# Patient Record
Sex: FEMALE | Race: WHITE
[De-identification: names, ages, dates, MRNs, and addresses within clinical notes are randomized per-mention and may not be internally consistent; named-entity substitution may affect disease eponyms.]

---
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
## 2020-01-03 NOTE — ED PHYSICIAN DOCUMENTATION
History of Present Illness





- Stated complaint


Stated Complaint: THROAT SWELLING/PRESSURE IN EARS





- Chief complaint


Chief Complaint: Heent





- Additonal information


Additional information: 





This is a 19F who presents with several days of sorethroat, as well as mild to 

moderate headache which was gradual in onset. She has felt congested and her 

hearing feels somewhat muffled as well. No shortness of breath, weakness, 

numbness. She has had cough and nasal congestion as well.





Review of Systems


Throat: reports: Sore throat


Cardiac: denies: Chest pain / pressure


Respiratory: denies: Dyspnea





PD PAST MEDICAL HISTORY





- Past Medical History


Past Medical History: No





- Past Surgical History


Past Surgical History: No





- Present Medications


Home Medications: 


                                Ambulatory Orders











 Medication  Instructions  Recorded  Confirmed


 


Fluticasone [Flonase] 1 sprays SARTHAK BID PRN #1 bottle 12/27/19 














- Allergies


Allergies/Adverse Reactions: 


                                    Allergies











Allergy/AdvReac Type Severity Reaction Status Date / Time


 


No Known Drug Allergies Allergy   Verified 12/27/19 16:19














- Social History


Does the pt smoke?: No


Smoking Status: Never smoker


Does the pt drink ETOH?: No


Substance Use and Type: Marijuana





PD ED PE NORMAL





- Vitals


Vital signs reviewed: Yes





- General


General: Alert and oriented X 3





- HEENT


HEENT: Atraumatic, PERRL, Other (BL serous effusions, no purulence or bulging of

 the TMS. Post pharnx erythematous without exudate.)





- Neck


Neck: Supple, no meningeal sign





- Cardiac


Cardiac: RRR





- Respiratory


Respiratory: No respiratory distress, Clear bilaterally





- Abdomen


Abdomen: Non distended





- Neuro


Neuro: Alert and oriented X 3, CNs 2-12 intact, No motor deficit, No sensory 

deficit, Normal speech





Results





- Vitals


Vitals: 





                                     Oxygen











O2 Source                      Room air

















- Labs


Labs: 





                                  Microbiology











 12/27/19 16:20 Group A Strep Throat Culture - Final





 Throat    MIXED OROPHARYNGEAL KRISTIAN PRESENT. NO BETA STREP PRESENT IN





    CULTURE.








                                Laboratory Tests











  12/27/19





  16:20


 


Group A Strep Rapid  Negative














PD MEDICAL DECISION MAKING





- ED course


ED course: 


Pt appears to have a URI with pharyngitis and serous effusions. No signs of 

bacterial otitis media, pneumonia, PTA, or sinusitis at this time. Headache is 

benign in history and without red flags. She is well appearing. Rapid strep is 

negative and her symptoms are more consistent with viral URI. She was given 

dexamethasone and supportive care and symptom-controling medications reviewed. 

PCP follow up and return precautions discussed and she was discharged home in 

good condition








Departure





- Departure


Disposition: 01 Home, Self Care


Clinical Impression: 


 Viral upper respiratory illness





Condition: Good


Instructions:  Sore Throat


Prescriptions: 


Fluticasone [Flonase] 1 sprays SARTHAK BID PRN #1 bottle


 PRN Reason: Nasal Congestion


Comments: 


You were seen today for Sore throat and pressure in your ears.  It appears that 

you have some eustachian tube dysfunction leading to fluid behind your ears.  

You can try decongestant such as Benadryl 25-50 mg up to 3 times daily, or 

Sudafed.  You may also take Tylenol 650 mg every 6 hours and ibuprofen 600 mg 

every 6 hours for fever or discomfort.  I am also prescribing you fluticasone, 

which is a nasal steroid spray which may help.  Your strep swab was negative 

today, but it was sent to culture and if the culture is positive you will be 

called and started on antibiotics.  If you develop any worsening symptoms like 

we talked about, return to the emergency department.


Discharge Date/Time: 12/27/19 18:48

## 2022-01-10 ENCOUNTER — HOSPITAL ENCOUNTER (EMERGENCY)
Dept: HOSPITAL 76 - ED | Age: 22
Discharge: HOME | End: 2022-01-10
Payer: MEDICAID

## 2022-01-10 VITALS — DIASTOLIC BLOOD PRESSURE: 62 MMHG | SYSTOLIC BLOOD PRESSURE: 128 MMHG

## 2022-01-10 DIAGNOSIS — N93.8: Primary | ICD-10-CM

## 2022-01-10 DIAGNOSIS — N92.0: ICD-10-CM

## 2022-01-10 LAB
ALBUMIN DIAFP-MCNC: 4 G/DL (ref 3.2–5.5)
ALBUMIN/GLOB SERPL: 1 {RATIO} (ref 1–2.2)
ALP SERPL-CCNC: 71 IU/L (ref 42–121)
ALT SERPL W P-5'-P-CCNC: 23 IU/L (ref 10–60)
ANION GAP SERPL CALCULATED.4IONS-SCNC: 8 MMOL/L (ref 6–13)
AST SERPL W P-5'-P-CCNC: 18 IU/L (ref 10–42)
BASOPHILS NFR BLD AUTO: 0 10^3/UL (ref 0–0.1)
BASOPHILS NFR BLD AUTO: 0.3 %
BILIRUB BLD-MCNC: 0.4 MG/DL (ref 0.2–1)
BUN SERPL-MCNC: 8 MG/DL (ref 6–20)
CALCIUM UR-MCNC: 8.9 MG/DL (ref 8.5–10.3)
CHLORIDE SERPL-SCNC: 105 MMOL/L (ref 101–111)
CLARITY UR REFRACT.AUTO: (no result)
CO2 SERPL-SCNC: 26 MMOL/L (ref 21–32)
CREAT SERPLBLD-SCNC: 0.6 MG/DL (ref 0.4–1)
EOSINOPHIL # BLD AUTO: 0.1 10^3/UL (ref 0–0.7)
EOSINOPHIL NFR BLD AUTO: 0.8 %
ERYTHROCYTE [DISTWIDTH] IN BLOOD BY AUTOMATED COUNT: 13.5 % (ref 12–15)
GFRSERPLBLD MDRD-ARVRAT: 126 ML/MIN/{1.73_M2} (ref 89–?)
GLOBULIN SER-MCNC: 3.9 G/DL (ref 2.1–4.2)
GLUCOSE SERPL-MCNC: 116 MG/DL (ref 70–100)
GLUCOSE UR QL STRIP.AUTO: NEGATIVE MG/DL
HCG UR QL: NEGATIVE
HCT VFR BLD AUTO: 40.9 % (ref 37–47)
HGB UR QL STRIP: 13.1 G/DL (ref 12–16)
KETONES UR QL STRIP.AUTO: NEGATIVE MG/DL
LIPASE SERPL-CCNC: 37 U/L (ref 22–51)
LYMPHOCYTES # SPEC AUTO: 4 10^3/UL (ref 1.5–3.5)
LYMPHOCYTES NFR BLD AUTO: 26.3 %
MCH RBC QN AUTO: 29.4 PG (ref 27–31)
MCHC RBC AUTO-ENTMCNC: 32 G/DL (ref 32–36)
MCV RBC AUTO: 91.7 FL (ref 81–99)
MONOCYTES # BLD AUTO: 0.6 10^3/UL (ref 0–1)
MONOCYTES NFR BLD AUTO: 4.1 %
NEUTROPHILS # BLD AUTO: 10.3 10^3/UL (ref 1.5–6.6)
NEUTROPHILS # SNV AUTO: 15.1 X10^3/UL (ref 4.8–10.8)
NEUTROPHILS NFR BLD AUTO: 68.1 %
NITRITE UR QL STRIP.AUTO: NEGATIVE
NRBC # BLD AUTO: 0 /100WBC
NRBC # BLD AUTO: 0 X10^3/UL
PDW BLD AUTO: 9.4 FL (ref 7.9–10.8)
PH UR STRIP.AUTO: 6 PH (ref 5–7.5)
PLATELET # BLD: 317 10^3/UL (ref 130–450)
POTASSIUM SERPL-SCNC: 4.3 MMOL/L (ref 3.5–5)
PROT SPEC-MCNC: 7.9 G/DL (ref 6.7–8.2)
PROT UR STRIP.AUTO-MCNC: NEGATIVE MG/DL
RBC # UR STRIP.AUTO: (no result) /UL
RBC # URNS HPF: (no result) /HPF (ref 0–5)
RBC MAR: 4.46 10^6/UL (ref 4.2–5.4)
SODIUM SERPLBLD-SCNC: 139 MMOL/L (ref 135–145)
SP GR UR STRIP.AUTO: 1.01 (ref 1–1.03)
SQUAMOUS URNS QL MICRO: (no result)
UROBILINOGEN UR QL STRIP.AUTO: (no result) E.U./DL
UROBILINOGEN UR STRIP.AUTO-MCNC: NEGATIVE MG/DL
WBC # UR MANUAL: (no result) /HPF (ref 0–5)

## 2022-01-10 PROCEDURE — 87086 URINE CULTURE/COLONY COUNT: CPT

## 2022-01-10 PROCEDURE — 80053 COMPREHEN METABOLIC PANEL: CPT

## 2022-01-10 PROCEDURE — 81001 URINALYSIS AUTO W/SCOPE: CPT

## 2022-01-10 PROCEDURE — 81003 URINALYSIS AUTO W/O SCOPE: CPT

## 2022-01-10 PROCEDURE — 85025 COMPLETE CBC W/AUTO DIFF WBC: CPT

## 2022-01-10 PROCEDURE — 36415 COLL VENOUS BLD VENIPUNCTURE: CPT

## 2022-01-10 PROCEDURE — 83690 ASSAY OF LIPASE: CPT

## 2022-01-10 PROCEDURE — 81025 URINE PREGNANCY TEST: CPT

## 2022-01-10 PROCEDURE — 99283 EMERGENCY DEPT VISIT LOW MDM: CPT

## 2022-01-10 NOTE — ED PHYSICIAN DOCUMENTATION
History of Present Illness





- Stated complaint


Stated Complaint: MENSTRUAL ISSUES





- Chief complaint


Chief Complaint: Abd Pain





- History obtained from


History obtained from: Patient





- Additonal information


Additional information: 





21-year-old woman with history of PCOS had Nexplanon placed few months ago.  

Last week and a half has had heavy vaginal bleeding associated with menstrual 

cramps that do not seem to lateralize.





Review of Systems


Constitutional: denies: Fever, Chills


Cardiac: reports: Reviewed and negative


Respiratory: reports: Reviewed and negative





PD PAST MEDICAL HISTORY





- Past Surgical History


Past Surgical History: No





- Present Medications


Home Medications: 


                                Ambulatory Orders











 Medication  Instructions  Recorded  Confirmed


 


Etonogestrel [Nexplanon] 68 mg SQ ONCE 01/10/22 01/10/22


 


HYDROcod/ACETAM 5/325 [Norco 5/325] 1 - 2 tablet PO Q6H PRN #14 tablet 01/10/22 


 


Norgestimate-Ethinyl Estradiol 1 each PO TID #1 packet 01/10/22 





[Ortho Tri-Cyclen 28 Tablet]   














- Allergies


Allergies/Adverse Reactions: 


                                    Allergies











Allergy/AdvReac Type Severity Reaction Status Date / Time


 


No Known Drug Allergies Allergy   Verified 01/10/22 15:28














- Social History


Does the pt smoke?: No


Smoking Status: Never smoker


Does the pt drink ETOH?: No





PD ED PE NORMAL





- Vitals


Vital signs reviewed: Yes





- General


General: Alert and oriented X 3, No acute distress





- Abdomen


Abdomen: Normal bowel sounds, Soft, Non tender





- Neuro


Neuro: Alert and oriented X 3, Normal speech





- Psych


Psych: Normal mood, Normal affect





Results





- Vitals


Vitals: 





                               Vital Signs - 24 hr











  01/10/22





  15:29


 


Temperature 36 C L


 


Heart Rate 97


 


Respiratory 18





Rate 


 


Blood Pressure 138/64 H


 


O2 Saturation 99








                                     Oxygen











O2 Source                      Room air

















- Labs


Labs: 





                                Laboratory Tests











  01/10/22 01/10/22 01/10/22





  16:07 16:07 17:07


 


WBC  15.1 H  


 


RBC  4.46  


 


Hgb  13.1  


 


Hct  40.9  


 


MCV  91.7  


 


MCH  29.4  


 


MCHC  32.0  


 


RDW  13.5  


 


Plt Count  317  


 


MPV  9.4  


 


Neut # (Auto)  10.3 H  


 


Lymph # (Auto)  4.0 H  


 


Mono # (Auto)  0.6  


 


Eos # (Auto)  0.1  


 


Baso # (Auto)  0.0  


 


Absolute Nucleated RBC  0.00  


 


Nucleated RBC %  0.0  


 


Sodium   139 


 


Potassium   4.3 


 


Chloride   105 


 


Carbon Dioxide   26 


 


Anion Gap   8.0 


 


BUN   8 


 


Creatinine   0.6 


 


Estimated GFR (MDRD)   126 


 


Glucose   116 H 


 


Calcium   8.9 


 


Total Bilirubin   0.4 


 


AST   18 


 


ALT   23 


 


Alkaline Phosphatase   71 


 


Total Protein   7.9 


 


Albumin   4.0 


 


Globulin   3.9 


 


Albumin/Globulin Ratio   1.0 


 


Lipase   37 


 


Urine Color    LT RED


 


Urine Clarity    SL. CLOUDY


 


Urine pH    6.0


 


Ur Specific Gravity    1.015


 


Urine Protein    NEGATIVE


 


Urine Glucose (UA)    NEGATIVE


 


Urine Ketones    NEGATIVE


 


Urine Occult Blood    LARGE H


 


Urine Nitrite    NEGATIVE


 


Urine Bilirubin    NEGATIVE


 


Urine Urobilinogen    0.2 (NORMAL)


 


Ur Leukocyte Esterase    TRACE H


 


Urine RBC    TNTC H


 


Urine WBC    0-3


 


Ur Squamous Epith Cells    NONE SEEN


 


Urine Bacteria    None Seen


 


Ur Microscopic Review    INDICATED


 


Urine Culture Comments    INDICATED


 


Urine HCG, Qual    NEGATIVE














PD MEDICAL DECISION MAKING





- ED course


ED course: 





21-year-old woman with heavy vaginal bleeding not associated with pregnancy, and

menstrual pain but H&H and pregnancy test are normal/negative.





Departure





- Departure


Disposition: 01 Home, Self Care


Clinical Impression: 


 DUB (dysfunctional uterine bleeding), Menorrhagia





Condition: Good


Record reviewed to determine appropriate education?: Yes


Instructions:  ED Bleed Irregular Vaginal


Follow-Up: 


 [facility] [Provider Group]


Prescriptions: 


HYDROcod/ACETAM 5/325 [Norco 5/325] 1 - 2 tablet PO Q6H PRN #14 tablet


 PRN Reason: Pain


Norgestimate-Ethinyl Estradiol [Ortho Tri-Cyclen 28 Tablet] 1 each PO TID #1 

packet

## 2022-09-03 ENCOUNTER — HOSPITAL ENCOUNTER (OUTPATIENT)
Dept: HOSPITAL 76 - LAB.N | Age: 22
Discharge: HOME | End: 2022-09-03
Attending: PHYSICIAN ASSISTANT
Payer: MEDICAID

## 2022-09-03 DIAGNOSIS — N76.0: Primary | ICD-10-CM

## 2022-09-03 LAB
BV DNA PNL VAG NAA+PROBE: NEGATIVE
C GLABRATA DNA BLD QL NAA+PROBE: NEGATIVE
C KRUSEI DNA VAG QL NAA+PROBE: NEGATIVE
C TRACH DNA SPEC NAA+PROBE-ACNC: NEGATIVE
CANDIDA DNA VAG QL NAA+PROBE: NEGATIVE
N GONORRHOEA DNA GENITAL QL NAA+PROBE: NEGATIVE
T VAGINALIS RRNA GENITAL QL PROBE: (no result)
T VAGINALIS RRNA GENITAL QL PROBE: NEGATIVE

## 2022-09-03 PROCEDURE — 87591 N.GONORRHOEAE DNA AMP PROB: CPT

## 2022-09-03 PROCEDURE — 87661 TRICHOMONAS VAGINALIS AMPLIF: CPT

## 2022-09-03 PROCEDURE — 87491 CHLMYD TRACH DNA AMP PROBE: CPT

## 2022-09-03 PROCEDURE — 81514 NFCT DS BV&VAGINITIS DNA ALG: CPT
